# Patient Record
(demographics unavailable — no encounter records)

---

## 2024-11-05 NOTE — HISTORY OF PRESENT ILLNESS
[FreeTextEntry1] : Patient is a 73-year-old female with history significant for A-fib currently on Eliquis, myocardial infarction secondary to coronary artery disease s/p PCI x 9, ovarian cancer status post chemo and radiation, breast cancer status post lumpectomy, and hypertension who presents the office today for evaluation of bilateral lower extremity claudication symptoms.  Patient reports equal bilateral lower extremity claudication for the past 6 months mostly localized to the thighs.  Over the past few weeks patient's symptoms have become significantly worse with inability to walk more than half a block.  Denies rest pain.  Denies tissue loss.  No history of smoking.

## 2024-11-05 NOTE — PHYSICAL EXAM
[Normal Breath Sounds] : Normal breath sounds [Normal Rate and Rhythm] : normal rate and rhythm [2+] : left 2+ [1+] : left 1+ [Ankle Swelling (On Exam)] : not present [] : not present [No Rash or Lesion] : No rash or lesion [Alert] : alert [Calm] : calm [de-identified] : Appears well, no acute distress noted [de-identified] : Intact

## 2024-11-05 NOTE — ASSESSMENT
[FreeTextEntry1] : 73-year-old female with bilateral lower extremity claudication.   PVRs were repeated and showed worsening of ABIs and significant drop postexercise.  No present rest pain or tissue loss.  Recommend conservative management of peripheral vascular disease and atrial fibrillation with aspirin, Eliquis, Zetia in addition to cilostazol and exercise program.  Due to worsening of symptoms despite conservative management and significant drop secondary to exercise and disabling claudication, we will proceed with lower extremity angiogram and possible intervention.

## 2024-11-20 NOTE — PROCEDURE
[D/C IV on discharge] : D/C IV on discharge [Resume diet] : resume diet [FreeTextEntry1] : aortogram, left leg angiogram

## 2024-11-20 NOTE — HISTORY OF PRESENT ILLNESS
[FreeTextEntry1] : accompanied by sister Clotilde [phone] Cr 0.93 11/6/2024 feels ok took aspirin, labetalol and cilostazol this morning last Eliquis yesterday morning  [FreeTextEntry5] : yesterday at 600pm [FreeTextEntry6] : Dr. Jackson

## 2024-11-20 NOTE — HISTORY OF PRESENT ILLNESS
[FreeTextEntry1] : accompanied by sister Clotilde [phone] Cr 0.93 11/6/2024 feels ok took aspirin, labetalol and cilostazol this morning last Eliquis yesterday morning  [FreeTextEntry5] : yesterday at 600pm [FreeTextEntry6] : Dr. Jackson

## 2024-11-20 NOTE — ASSESSMENT
[FreeTextEntry1] : 73-year-old female with bilateral lower extremity claudication. PVRs were repeated and showed worsening of ABIs and significant drop postexercise. Due to worsening of symptoms despite conservative management and significant drop secondary to exercise and disabling claudication, plan for left lower extremity angiogram and possible intervention.

## 2024-11-20 NOTE — PAST MEDICAL HISTORY
[Increasing age ( >40 years old)] : Increasing age ( >40 years old) [Malignancy] : Malignancy [Major surgery] : Major surgery [No therapy indicated for cases scheduled for less than one hour] : No therapy indicated for cases scheduled for less than one hour. [FreeTextEntry1] : Malignant Hyperthermia Screening Tool and Risk of Bleeding Assessment  Ms. JOEL DONALD denies family history of unexpected death following Anesthesia or Exercise. Denies Family history of Malignant Hyperthermia, Muscle or Neuromuscular disorder and High Temperature following exercise.  Ms. JOEL DONALD denies history of Muscle Spasm, Dark or Chocolate - Colored urine and Unanticipated fever immediately following anesthesia or serious exercise.  Ms. DONALD also denies bleeding tendencies/ Risks of Bleeding.

## 2024-11-20 NOTE — HISTORY OF PRESENT ILLNESS
[FreeTextEntry1] : accompanied by sister Clotilde [phone] Cr 0.93 11/6/2024 feels ok took aspirin, labetalol and cilostazol this morning last Eliquis yesterday morning  [FreeTextEntry5] : yesterday at 600pm [FreeTextEntry6] : Dr. Jackson

## 2024-12-30 NOTE — REASON FOR VISIT
[de-identified] : Status post left iliofemoral bypass.  Patient doing well.  No rest pain.  Improve claudication.  Palpable DP pulse.  Incisions are clean.  Every other staple was removed.  Follow-up in 2 weeks to remove the rest of the staples and duplex.  At this time the patient does not have significant rest pain of the right foot.  Will follow closely.

## 2025-01-16 NOTE — REASON FOR VISIT
[de-identified] : Status post left iliofemoral bypass.  Patient was admitted with UTI.  Incisions are clean.  Staples were removed.  Left foot has a palpable DP pulse.  Patient doing very well.  Will need right lower extremity revascularization in the near future.  Follow-up in 1 to 2 months.

## 2025-03-12 NOTE — ASSESSMENT
[FreeTextEntry1] : Patient with severe peripheral vascular disease status post left iliofemoral bypass with excellent results with a palpable dorsalis pedis pulse.  Plan for right iliofemoral bypass.  This was discussed with the patient in detail.  Risks benefits and alternative modes of treatment were all discussed.  Risk of bleeding infection and limb loss was discussed.  Patient needs medical clearance.

## 2025-03-12 NOTE — HISTORY OF PRESENT ILLNESS
[FreeTextEntry1] : Patient is a 73-year-old with severe peripheral vascular disease.  Status post left iliofemoral bypass with excellent results.  Patient has no significant claudication or rest pain of left lower extremity.  Patient continues to complain of disabling claudication of the right lower extremity which limits her ability to walk.

## 2025-04-29 NOTE — REASON FOR VISIT
[de-identified] : Status post right iliofemoral bypass with extensive endarterectomies.  Patient doing very well.  No further claudication or rest pain symptoms.  Incisions look clean.  Abdominal staples were removed and every other groin staple was removed.  Follow-up in 2 weeks with bypass duplex and removal of staples.

## 2025-05-12 NOTE — REASON FOR VISIT
[de-identified] : Status post right iliofemoral bypass with extensive endarterectomies.  Patient doing very well.  No further claudication or rest pain symptoms.  Incisions look clean.  Staples removed.  Duplex shows widely patent bypass.  Follow-up in 3 months with repeat duplex.